# Patient Record
Sex: FEMALE | Race: WHITE
[De-identification: names, ages, dates, MRNs, and addresses within clinical notes are randomized per-mention and may not be internally consistent; named-entity substitution may affect disease eponyms.]

---

## 2020-05-14 ENCOUNTER — HOSPITAL ENCOUNTER (OUTPATIENT)
Dept: HOSPITAL 92 - SDC | Age: 25
Discharge: HOME | End: 2020-05-14
Attending: OPHTHALMOLOGY
Payer: COMMERCIAL

## 2020-05-14 VITALS — BODY MASS INDEX: 39.9 KG/M2

## 2020-05-14 DIAGNOSIS — H33.001: Primary | ICD-10-CM

## 2020-05-14 DIAGNOSIS — E66.9: ICD-10-CM

## 2020-05-14 PROCEDURE — 08U03JZ SUPPLEMENT OF RIGHT EYE WITH SYNTHETIC SUBSTITUTE, PERCUTANEOUS APPROACH: ICD-10-PCS | Performed by: OPHTHALMOLOGY

## 2020-05-14 PROCEDURE — 08QE3ZZ REPAIR RIGHT RETINA, PERCUTANEOUS APPROACH: ICD-10-PCS | Performed by: OPHTHALMOLOGY

## 2020-05-14 PROCEDURE — S0028 INJECTION, FAMOTIDINE, 20 MG: HCPCS

## 2020-05-15 NOTE — OP
DATE OF PROCEDURE:  05/14/2020



PREOPERATIVE DIAGNOSIS:  Rhegmatogenous retinal detachment, right eye.



POSTOPERATIVE DIAGNOSIS:  Rhegmatogenous retinal detachment, right eye.



PROCEDURE PERFORMED:  Scleral buckle with cryotherapy, right eye.



ANESTHESIA:  General endotracheal anesthesia.



DESCRIPTION OF PROCEDURE:  The patient was taken to the operative suite, where

appropriate general endotracheal anesthesia was initiated.  The patient was prepped

and draped in usual sterile manner for ophthalmic surgery in right eye.  Lid

speculum was placed in the right eye.  A 360 conjunctival peritomy was created by

sharp dissection with Mg scissors.  The rectus muscles were looped on 2-0 silk

ties.  A #41 band was encircled around the eye and positioned, ligated end-to-end

superonasally with 3083 sleeve.  A 287 WG was then placed underneath the band with

endpoint superonasally and inferiorly.  The break was identified via indirect

ophthalmoscopy, and two spots of cryotherapy were placed over the break.  External

drain was created at the 9:30 o'clock meridian, and a good drainage of subretinal

fluid was obtained.  No hemorrhage was noted.  Buckle was tightened up, and position

was inspected by indirect ophthalmoscopy.  Anterior chamber was tapped until the

pressure was reduced and at that time, it was noted that the optic nerve was

perfused.  Retrobulbar Kenalog and subconjunctival Ancef were placed.  Additional

retrobulbar block was placed, and the conjunctiva was closed with 6-0 plain gut

suture.  Antibiotic ointment and atropine were placed.  The eye was patched and

shielded.  The patient was awakened and taken to postop recovery unit in good

condition, having suffered no immediate perioperative complications.  The patient

was instructed to keep patch shield on, avoid lifting or bending, position right

side down, and followup appointment with Dr. Plunkett. 







Job ID:  [id number]